# Patient Record
Sex: FEMALE | Race: WHITE | NOT HISPANIC OR LATINO | URBAN - METROPOLITAN AREA
[De-identification: names, ages, dates, MRNs, and addresses within clinical notes are randomized per-mention and may not be internally consistent; named-entity substitution may affect disease eponyms.]

---

## 2022-01-09 ENCOUNTER — EMERGENCY (EMERGENCY)
Facility: HOSPITAL | Age: 22
LOS: 1 days | Discharge: HOME | End: 2022-01-09
Admitting: EMERGENCY MEDICINE
Payer: COMMERCIAL

## 2022-01-09 VITALS
DIASTOLIC BLOOD PRESSURE: 78 MMHG | OXYGEN SATURATION: 98 % | RESPIRATION RATE: 18 BRPM | HEART RATE: 49 BPM | HEIGHT: 70 IN | WEIGHT: 149.91 LBS | SYSTOLIC BLOOD PRESSURE: 125 MMHG | TEMPERATURE: 98 F

## 2022-01-09 DIAGNOSIS — L50.0 ALLERGIC URTICARIA: ICD-10-CM

## 2022-01-09 PROCEDURE — 99284 EMERGENCY DEPT VISIT MOD MDM: CPT

## 2022-01-09 RX ORDER — DEXAMETHASONE 0.5 MG/5ML
10 ELIXIR ORAL ONCE
Refills: 0 | Status: DISCONTINUED | OUTPATIENT
Start: 2022-01-09 | End: 2022-01-09

## 2022-01-09 RX ORDER — DIPHENHYDRAMINE HCL 50 MG
25 CAPSULE ORAL ONCE
Refills: 0 | Status: COMPLETED | OUTPATIENT
Start: 2022-01-09 | End: 2022-01-09

## 2022-01-09 RX ORDER — DIPHENHYDRAMINE HCL 50 MG
1 CAPSULE ORAL
Qty: 16 | Refills: 0
Start: 2022-01-09 | End: 2022-01-12

## 2022-01-09 RX ORDER — DEXAMETHASONE 0.5 MG/5ML
10 ELIXIR ORAL ONCE
Refills: 0 | Status: COMPLETED | OUTPATIENT
Start: 2022-01-09 | End: 2022-01-09

## 2022-01-09 RX ADMIN — Medication 10 MILLIGRAM(S): at 16:12

## 2022-01-09 RX ADMIN — Medication 25 MILLIGRAM(S): at 16:12

## 2022-01-09 NOTE — ED PROVIDER NOTE - CLINICAL SUMMARY MEDICAL DECISION MAKING FREE TEXT BOX
Patient here with urticarial rash consistent with contact dermatitis. Unknown allergen. Will give shot of Decadron and expedite dermatology and allergist appointment with the help of FRANK

## 2022-01-09 NOTE — ED ADULT NURSE NOTE - OBJECTIVE STATEMENT
rash to face and body x 1 week, no relief with meds (antihistamines), no s/s of distress, awaiting provider eval

## 2022-01-09 NOTE — ED PROVIDER NOTE - OBJECTIVE STATEMENT
22 y/o female presenting with generalized rash x 1 week. Was seen at  earlier this week and was given Prednisone, Pepcid, and Cetirizine; not given Benadryl. Now c/o urticarial rash to lower and upper extremities, face, and torso. No known allergen. No new exposures.

## 2022-01-09 NOTE — ED ADULT NURSE NOTE - CHIEF COMPLAINT
The patient is a 21y Female complaining of rash. Spine appears normal, range of motion is not limited, no muscle or joint tenderness

## 2022-01-09 NOTE — ED ADULT TRIAGE NOTE - CHIEF COMPLAINT QUOTE
c/o generalized rash to face and body x 1 week, gradually worsening after rx'd antihistamines from 3-4 days at . denies SOB. no airway involvement

## 2022-01-09 NOTE — ED PROVIDER NOTE - PATIENT PORTAL LINK FT
You can access the FollowMyHealth Patient Portal offered by [facility] by registering at the following website: [email or web address]. By joining [facility]’s FollowMyHealth portal, you will also be able to view your health information using other applications (apps) compatible with our system.